# Patient Record
Sex: MALE | Race: WHITE
[De-identification: names, ages, dates, MRNs, and addresses within clinical notes are randomized per-mention and may not be internally consistent; named-entity substitution may affect disease eponyms.]

---

## 2019-07-10 ENCOUNTER — HOSPITAL ENCOUNTER (OUTPATIENT)
Dept: HOSPITAL 75 - RAD | Age: 74
End: 2019-07-10
Attending: NURSE PRACTITIONER
Payer: MEDICARE

## 2019-07-10 DIAGNOSIS — Z87.01: ICD-10-CM

## 2019-07-10 DIAGNOSIS — J84.9: ICD-10-CM

## 2019-07-10 DIAGNOSIS — J44.9: Primary | ICD-10-CM

## 2019-07-10 PROCEDURE — 71046 X-RAY EXAM CHEST 2 VIEWS: CPT

## 2019-07-10 NOTE — DIAGNOSTIC IMAGING REPORT
INDICATION: History of pneumonia and difficulty breathing.



TIME OF EXAM: 10:55 a.m.



COMPARISON: Correlation is made with prior study from 08/11/2016.



FINDINGS: Heart size is stable. Lungs are hyperinflated

consistent with COPD. There are some chronic-appearing

interstitial changes in the mid and lower lung fields

bilaterally. No parenchymal consolidation is seen. No effusion is

identified. There is no pneumothorax.



IMPRESSION: COPD and chronic interstitial changes. No acute

abnormality is detected.



Dictated by: 



  Dictated on workstation # [id number]

## 2019-12-20 ENCOUNTER — HOSPITAL ENCOUNTER (OUTPATIENT)
Dept: HOSPITAL 75 - LAB | Age: 74
End: 2019-12-20
Attending: INTERNAL MEDICINE
Payer: MEDICARE

## 2019-12-20 DIAGNOSIS — N39.0: Primary | ICD-10-CM

## 2019-12-20 DIAGNOSIS — N41.9: ICD-10-CM

## 2019-12-20 DIAGNOSIS — R50.9: ICD-10-CM

## 2019-12-20 LAB
ALBUMIN SERPL-MCNC: 4.5 GM/DL (ref 3.2–4.5)
ALP SERPL-CCNC: 73 U/L (ref 40–136)
ALT SERPL-CCNC: 12 U/L (ref 0–55)
APTT PPP: YELLOW S
BACTERIA #/AREA URNS HPF: (no result) /HPF
BILIRUB SERPL-MCNC: 0.5 MG/DL (ref 0.1–1)
BILIRUB UR QL STRIP: NEGATIVE
BUN/CREAT SERPL: 16
CALCIUM SERPL-MCNC: 9.8 MG/DL (ref 8.5–10.1)
CHLORIDE SERPL-SCNC: 103 MMOL/L (ref 98–107)
CO2 SERPL-SCNC: 27 MMOL/L (ref 21–32)
CREAT SERPL-MCNC: 1.05 MG/DL (ref 0.6–1.3)
ERYTHROCYTE [DISTWIDTH] IN BLOOD BY AUTOMATED COUNT: 14.3 % (ref 10–14.5)
ERYTHROCYTE [SEDIMENTATION RATE] IN BLOOD: 5 MM/HR (ref 0–30)
FIBRINOGEN PPP-MCNC: CLEAR MG/DL
GFR SERPLBLD BASED ON 1.73 SQ M-ARVRAT: > 60 ML/MIN
GLUCOSE SERPL-MCNC: 100 MG/DL (ref 70–105)
GLUCOSE UR STRIP-MCNC: NEGATIVE MG/DL
HCT VFR BLD CALC: 45 % (ref 40–54)
HGB BLD-MCNC: 14.1 G/DL (ref 13.3–17.7)
KETONES UR QL STRIP: NEGATIVE
LEUKOCYTE ESTERASE UR QL STRIP: NEGATIVE
MCH RBC QN AUTO: 26 PG (ref 25–34)
MCHC RBC AUTO-ENTMCNC: 32 G/DL (ref 32–36)
MCV RBC AUTO: 81 FL (ref 80–99)
NITRITE UR QL STRIP: NEGATIVE
PH UR STRIP: 5.5 [PH] (ref 5–9)
PLATELET # BLD: 207 10^3/UL (ref 130–400)
PMV BLD AUTO: 11.4 FL (ref 7.4–10.4)
POTASSIUM SERPL-SCNC: 4.6 MMOL/L (ref 3.6–5)
PROT SERPL-MCNC: 8.1 GM/DL (ref 6.4–8.2)
PROT UR QL STRIP: NEGATIVE
RBC #/AREA URNS HPF: (no result) /HPF
SODIUM SERPL-SCNC: 138 MMOL/L (ref 135–145)
SP GR UR STRIP: >=1.03 (ref 1.02–1.02)
WBC # BLD AUTO: 7 10^3/UL (ref 4.3–11)
WBC #/AREA URNS HPF: (no result) /HPF

## 2019-12-20 PROCEDURE — 83605 ASSAY OF LACTIC ACID: CPT

## 2019-12-20 PROCEDURE — 36415 COLL VENOUS BLD VENIPUNCTURE: CPT

## 2019-12-20 PROCEDURE — 80053 COMPREHEN METABOLIC PANEL: CPT

## 2019-12-20 PROCEDURE — 85027 COMPLETE CBC AUTOMATED: CPT

## 2019-12-20 PROCEDURE — 85652 RBC SED RATE AUTOMATED: CPT

## 2019-12-20 PROCEDURE — 81000 URINALYSIS NONAUTO W/SCOPE: CPT

## 2023-04-12 ENCOUNTER — HOSPITAL ENCOUNTER (OUTPATIENT)
Dept: HOSPITAL 75 - PREOP | Age: 78
Discharge: HOME | End: 2023-04-12
Attending: SURGERY
Payer: MEDICARE

## 2023-04-12 VITALS — HEIGHT: 65 IN | BODY MASS INDEX: 19.25 KG/M2 | WEIGHT: 115.52 LBS

## 2023-04-12 DIAGNOSIS — Z01.818: Primary | ICD-10-CM

## 2023-05-01 ENCOUNTER — HOSPITAL ENCOUNTER (OUTPATIENT)
Dept: HOSPITAL 75 - ENDO | Age: 78
Discharge: HOME | End: 2023-05-01
Attending: SURGERY
Payer: MEDICARE

## 2023-05-01 VITALS — WEIGHT: 115.52 LBS | BODY MASS INDEX: 19.25 KG/M2 | HEIGHT: 65 IN

## 2023-05-01 VITALS — DIASTOLIC BLOOD PRESSURE: 64 MMHG | SYSTOLIC BLOOD PRESSURE: 106 MMHG

## 2023-05-01 VITALS — DIASTOLIC BLOOD PRESSURE: 59 MMHG | SYSTOLIC BLOOD PRESSURE: 106 MMHG

## 2023-05-01 VITALS — SYSTOLIC BLOOD PRESSURE: 93 MMHG | DIASTOLIC BLOOD PRESSURE: 56 MMHG

## 2023-05-01 VITALS — DIASTOLIC BLOOD PRESSURE: 113 MMHG | SYSTOLIC BLOOD PRESSURE: 196 MMHG

## 2023-05-01 DIAGNOSIS — D12.0: ICD-10-CM

## 2023-05-01 DIAGNOSIS — N40.0: ICD-10-CM

## 2023-05-01 DIAGNOSIS — K29.70: ICD-10-CM

## 2023-05-01 DIAGNOSIS — K31.89: ICD-10-CM

## 2023-05-01 DIAGNOSIS — D12.3: ICD-10-CM

## 2023-05-01 DIAGNOSIS — Z12.11: Primary | ICD-10-CM

## 2023-05-01 DIAGNOSIS — K21.00: ICD-10-CM

## 2023-05-01 NOTE — OPERATIVE REPORT
DATE OF SERVICE: 05/01/2023



PREOPERATIVE DIAGNOSIS:

Screening colonoscopy.



POSTOPERATIVE DIAGNOSES:

Gastritis, colon polyps x2.



PROCEDURES:

Esophagogastroduodenoscopy with biopsies; colonoscopy with cold biopsy; 

polypectomy x1 of the cecum and hot biopsy polypectomy x1, transverse colon 

polyp.



SURGEON:

Tiny Vance DO



ANESTHESIA:

Per CRNA.



ESTIMATED BLOOD LOSS:

Scant.



COMPLICATIONS:

None.



INDICATIONS:

The patient is a 77-year-old male with needing colonoscopy and has reflux.  He 

understands risks and benefits of procedure and wishes to proceed.  Consent was 

signed in chart.



DESCRIPTION OF PROCEDURE:

The patient was taken to endoscopy suite, placed in left lateral recumbent 

position.  Timeout was performed.  Scope was inserted in the mouth, down the 

esophagus, stomach, into the duodenum without difficulty.  No polyps, masses or 

ulcerations in the duodenum.  Scope was slowly retracted back into the stomach, 

where it was further insufflated.  Gastritis appearance of the antrum was 

obtained.  Biopsy of the antrum was obtained.  Scope was retroflexed, noting no 

other pathology.  Scope was returned to its normal position, slowly withdrawn to

distal esophagus.  No polyps, masses or ulcerations.  Biopsy of the GE junction 

was obtained.  Scope was slowly retracted back until completely removed, noting 

no other pathology.  Digital rectal exam was performed.  No palpable polyps, 

masses or ulcerations.  Prostate had slightly enlarged.  Scope was inserted in 

the rectum, advanced all the way to the cecum with minimal difficulty.  Prep was

adequate with irrigation and suction. Cecum had a small polyp, which cold biopsy

polypectomy was performed.  Scope was then continuously slowly retracted back.  

No polyps, masses or ulcerations in the ascending colon.  In the transverse 

colon, had a larger flat polyp, which hot biopsy polypectomy was performed and 

fulgurated.  Scope was then continuously slowly retracted back.  No polyps, 

masses or ulcerations in the remainder of the transverse, descending and sigmoid

colon.  Once in the rectum, scope was inserted and retracted multiple times, 

noting no other pathology.  Scope was slowly retracted until completely removed.

 The patient tolerated the procedure well with no complications, taken to 

recovery room in stable condition.



RECOMMENDATIONS:

The patient will be started on Protonix 40 mg daily to see if this does improve 

his symptoms, await biopsy results.  We will need repeat colonoscopy in 1-3 

years depending on pathology.  Any issues before that, be seen at that time.





Job ID: [id number]

DocumentID: [id number]

Dictated Date: 05/01/2023 08:18:22

Transcription Date: 05/01/2023 09:05:00

Dictated By: TINY VANCE DO

## 2023-05-01 NOTE — PROGRESS NOTE-PRE OPERATIVE
Pre-Operative Progress Note


Date H&P Reviewed:  May 1, 2023


Time H&P Reviewed:  07:32


History & Physical:  H&P Reviewed, Patient Examed, No changes noted


Pre-Operative Diagnosis:  screening colonoscopy, gerd











TINY ALEXANDER DO               May 1, 2023 07:38

## 2023-05-01 NOTE — DISCHARGE INST-SIMPLE/STANDARD
Discharge Inst-Standard


Discharge Medications


New, Converted or Re-Newed RX:  Transmitted to Pharmacy





Patient Instructions/Follow Up


Plan of Care/Instructions/FU:  


2 weeks kinjal


Activity as Tolerated:  No


Discharge Diet:  Regular Diet











TINY ALEXANDER DO               May 1, 2023 08:14

## 2023-05-01 NOTE — ANESTHESIA-GENERAL POST-OP
MAC


Patient Condition


Mental Status/LOC:  Same as Preop


Cardiovascular:  Satisfactory


Nausea/Vomiting:  Absent


Respiratory:  Satisfactory


Pain:  Controlled


Complications:  Absent





Post Op Complications


Complications


None





Follow Up Care/Instructions


Patient Instructions


None needed.





Anesthesiology Discharge Order


Discharge Order


Patient is doing well, no complaints, stable vital signs, no apparent adverse 

anesthesia problems.   


No complications reported per nursing.











GRACIE SANDERS CRNA             May 1, 2023 08:16

## 2023-06-15 ENCOUNTER — HOSPITAL ENCOUNTER (OUTPATIENT)
Dept: HOSPITAL 75 - RAD | Age: 78
End: 2023-06-15
Attending: UROLOGY
Payer: MEDICARE

## 2023-06-15 DIAGNOSIS — N43.40: ICD-10-CM

## 2023-06-15 DIAGNOSIS — N43.3: Primary | ICD-10-CM

## 2023-06-15 DIAGNOSIS — N13.8: ICD-10-CM

## 2023-06-15 DIAGNOSIS — N40.1: ICD-10-CM

## 2023-06-15 DIAGNOSIS — N50.3: ICD-10-CM

## 2023-06-15 PROCEDURE — 76870 US EXAM SCROTUM: CPT

## 2023-06-15 NOTE — DIAGNOSTIC IMAGING REPORT
PROCEDURE: US Scrotum.



TECHNIQUE: Multiple real-time grayscale images were obtained over

the scrotum in various projections bilaterally.



INDICATION: Left-sided hydrocele and epididymal cyst.



Right testicle measures 4.8 x 2.0 x 4.2 cm and left testicle

measures 3.9 x 2.8 x 3.2 cm. Both testes show homogeneous

echotexture. No discrete testicular mass is seen. There is blood

flow to both testes. There are 2 small epididymal cysts on the

left, largest approximately 7 mm in size. There is a large left

hydrocele. No right-sided hydrocele is seen. No varicocele is

detected.



IMPRESSION:

1. No evidence of testicular mass or vascular compromise. 

2. Small epididymal head cyst on the left. 

3. Large left hydrocele.



Dictated by: 



  Dictated on workstation # [id number]